# Patient Record
Sex: FEMALE | Race: WHITE | Employment: UNEMPLOYED | URBAN - METROPOLITAN AREA
[De-identification: names, ages, dates, MRNs, and addresses within clinical notes are randomized per-mention and may not be internally consistent; named-entity substitution may affect disease eponyms.]

---

## 2024-10-25 ENCOUNTER — OFFICE VISIT (OUTPATIENT)
Age: 59
End: 2024-10-25

## 2024-10-25 VITALS
DIASTOLIC BLOOD PRESSURE: 90 MMHG | SYSTOLIC BLOOD PRESSURE: 160 MMHG | TEMPERATURE: 98 F | HEART RATE: 76 BPM | OXYGEN SATURATION: 98 % | BODY MASS INDEX: 36.25 KG/M2 | HEIGHT: 61 IN | WEIGHT: 192 LBS | RESPIRATION RATE: 16 BRPM

## 2024-10-25 DIAGNOSIS — R30.0 DYSURIA: Primary | ICD-10-CM

## 2024-10-25 DIAGNOSIS — R03.0 ELEVATED BLOOD PRESSURE READING: ICD-10-CM

## 2024-10-25 DIAGNOSIS — R31.9 HEMATURIA, UNSPECIFIED TYPE: ICD-10-CM

## 2024-10-25 LAB
BILIRUBIN, URINE, POC: NEGATIVE
BLOOD URINE, POC: ABNORMAL
GLUCOSE URINE, POC: NEGATIVE
KETONES, URINE, POC: NEGATIVE
LEUKOCYTE ESTERASE, URINE, POC: NEGATIVE
NITRITE, URINE, POC: NEGATIVE
PH, URINE, POC: 7 (ref 4.6–8)
PROTEIN,URINE, POC: 30
SPECIFIC GRAVITY, URINE, POC: 1.01 (ref 1–1.03)
URINALYSIS CLARITY, POC: ABNORMAL
URINALYSIS COLOR, POC: ABNORMAL
UROBILINOGEN, POC: ABNORMAL MG/DL

## 2024-10-25 RX ORDER — TAMSULOSIN HYDROCHLORIDE 0.4 MG/1
0.4 CAPSULE ORAL DAILY
Qty: 30 CAPSULE | Refills: 0 | Status: SHIPPED | OUTPATIENT
Start: 2024-10-25 | End: 2024-11-24

## 2024-10-25 NOTE — PROGRESS NOTES
10/25/2024   Rosa Us (: 1965) is a 59 y.o. female, New patient, here for evaluation of the following chief complaint(s):  Urinary Tract Infection (Urgency, frequency, blood in urine this morning - believes it's a kidney stone - pain for weeks, getting worse)     ASSESSMENT/PLAN:  Below is the assessment and plan developed based on review of pertinent history, physical exam, labs, studies, and medications.  1. Dysuria  -     AMB POC URINALYSIS DIP STICK MANUAL W/O MICRO  2. Hematuria, unspecified type  -     XR ABDOMEN (KUB) (SINGLE AP VIEW); Future  -     [facility] - Cullen Sebastian MD, Urology, [place]  3. Elevated blood pressure reading    - Flomax  - referral to Urology  - Follow up with PCP on BP    No kidney stone in ureter (as seen by me - XR results pending)      Handout given with care instructions  2. OTC for symptom management. Increase fluid intake, ensure adequate nutritional intake.  3. Follow up with PCP as needed.  4. Go to ED with development of any acute symptoms.     Follow up:  Return if symptoms worsen or fail to improve.  Follow up immediately for any new, worsening or changes or if symptoms are not improving over the next 5-7 days.     SUBJECTIVE/OBJECTIVE:    Urinary Tract Infection  Associated symptoms include hematuria.          Urinary Tract Infection (Urgency, frequency, blood in urine this morning - believes it's a kidney stone - pain for weeks, getting worse)      Results for orders placed or performed in visit on 10/25/24   XR ABDOMEN (KUB) (SINGLE AP VIEW)    Narrative    EXAM:  Abdomen, 1  View.    COMPARISON:  None provided.      FINDINGS:    The bowel gas pattern is within normal limits .     There is a faint 3 mm calcification at the right upper quadrant which may   represent a nephrolith.     No acute osseous abnormality. Soft tissues are unremarkable.       Impression    There is a faint 3 mm calcification at the right upper quadrant which may   represent a

## 2024-11-20 ENCOUNTER — OFFICE VISIT (OUTPATIENT)
Age: 59
End: 2024-11-20
Payer: COMMERCIAL

## 2024-11-20 VITALS
DIASTOLIC BLOOD PRESSURE: 96 MMHG | HEART RATE: 71 BPM | WEIGHT: 195 LBS | HEIGHT: 61 IN | SYSTOLIC BLOOD PRESSURE: 165 MMHG | BODY MASS INDEX: 36.82 KG/M2

## 2024-11-20 DIAGNOSIS — N20.0 KIDNEY STONE: ICD-10-CM

## 2024-11-20 DIAGNOSIS — N20.0 KIDNEY STONE: Primary | ICD-10-CM

## 2024-11-20 DIAGNOSIS — R31.0 GROSS HEMATURIA: ICD-10-CM

## 2024-11-20 DIAGNOSIS — R10.9 FLANK PAIN: ICD-10-CM

## 2024-11-20 DIAGNOSIS — R82.81 PYURIA: ICD-10-CM

## 2024-11-20 LAB
BILIRUBIN, URINE, POC: NEGATIVE
BLOOD URINE, POC: NEGATIVE
GLUCOSE URINE, POC: NEGATIVE
KETONES, URINE, POC: NEGATIVE
LEUKOCYTE ESTERASE, URINE, POC: NORMAL
NITRITE, URINE, POC: NEGATIVE
PH, URINE, POC: 6.5 (ref 4.6–8)
PROTEIN,URINE, POC: NORMAL
SPECIFIC GRAVITY, URINE, POC: 1.02 (ref 1–1.03)
URINALYSIS CLARITY, POC: CLEAR
URINALYSIS COLOR, POC: YELLOW
UROBILINOGEN, POC: NORMAL

## 2024-11-20 PROCEDURE — 99203 OFFICE O/P NEW LOW 30 MIN: CPT | Performed by: UROLOGY

## 2024-11-20 PROCEDURE — 81003 URINALYSIS AUTO W/O SCOPE: CPT | Performed by: UROLOGY

## 2024-11-20 NOTE — PROGRESS NOTES
HISTORY OF PRESENT ILLNESS  Rosa Us is a 59 y.o. female.   has no past medical history on file.  has a past surgical history that includes Hysterectomy and Hand tendon surgery (Right).  Chief Complaint   Patient presents with    New Patient     Passed a kidney stone     She is referred by Jessica Smith PA-C for possible kidney stones.   KUB 10/25/24 with a possible 3mm renal calcification.  No obvious ureteral stone.   She had 2 weeks of mild right flank pain gradually, and then intense sharp pain overnight. She also had pain in the bladder area.  She saw gross hematuria.  She went to urgent care and passed a stone on her own that day after leaving urgent care.  She did not take medication. She brings in a football shaped stone about [id number] to my eye.  Her hematuria went away a day later.    She has no h/o stones previously.                  1. Kidney stone  Assessment & Plan:   Stone has passed.  It is fairly large.  Sent for analysis.    She may have radiolucent stones since it was not seen on KUB.  I will check a CT for other stones.     She is advised to avoid dehydration.   Orders:  -     STONE ANALYSIS; Future  2. Gross hematuria  Assessment & Plan:   Resolved.    Orders:  -     AMB POC URINALYSIS DIP STICK AUTO W/O MICRO  -     Culture, Urine  -     CT ABDOMEN PELVIS W WO CONTRAST Additional Contrast? None; Future  3. Flank pain  -     CT ABDOMEN PELVIS W WO CONTRAST Additional Contrast? None; Future  4. Pyuria  Comments:  UA with signs of pyuria.  Sent for culture.      No Known Allergies   Prior to Admission medications    Medication Sig Start Date End Date Taking? Authorizing Provider   tamsulosin (FLOMAX) 0.4 MG capsule Take 1 capsule by mouth daily  Patient not taking: Reported on 11/20/2024 10/25/24 11/24/24  Jessica Smith PA-C     Past Medical History:  PMHx (including negatives):  has no past medical history on file.   PSurgHx:  has a past surgical history that includes Hysterectomy and

## 2024-11-20 NOTE — ASSESSMENT & PLAN NOTE
Stone has passed.  It is fairly large.  Sent for analysis.    She may have radiolucent stones since it was not seen on KUB.  I will check a CT for other stones.     She is advised to avoid dehydration.

## 2024-11-21 LAB — BACTERIA UR CULT: NORMAL

## 2024-11-25 LAB
CALCIUM OXALATE DIHYDRATE MFR STONE IR: 70 %
COLOR STONE: NORMAL
HYDROXYAPATITE: 30 %
LABORATORY COMMENT REPORT: NORMAL
Lab: NORMAL
Lab: NORMAL
PHOTO: NORMAL
SIZE STONE: NORMAL MM
SPEC SOURCE SUBJ: NORMAL
SPECIMEN STATUS REPORT: NORMAL
STONE ANALYSIS-IMP: NORMAL
STONE COMPOSITION: NORMAL
WT STONE: 552 MG